# Patient Record
Sex: FEMALE | Race: WHITE
[De-identification: names, ages, dates, MRNs, and addresses within clinical notes are randomized per-mention and may not be internally consistent; named-entity substitution may affect disease eponyms.]

---

## 2020-09-11 ENCOUNTER — APPOINTMENT (OUTPATIENT)
Dept: FAMILY MEDICINE | Facility: CLINIC | Age: 55
End: 2020-09-11
Payer: COMMERCIAL

## 2020-09-11 VITALS
TEMPERATURE: 98.4 F | DIASTOLIC BLOOD PRESSURE: 70 MMHG | HEIGHT: 63 IN | RESPIRATION RATE: 16 BRPM | WEIGHT: 113 LBS | OXYGEN SATURATION: 99 % | HEART RATE: 68 BPM | SYSTOLIC BLOOD PRESSURE: 110 MMHG | BODY MASS INDEX: 20.02 KG/M2

## 2020-09-11 DIAGNOSIS — Z78.9 OTHER SPECIFIED HEALTH STATUS: ICD-10-CM

## 2020-09-11 DIAGNOSIS — Z71.89 OTHER SPECIFIED COUNSELING: ICD-10-CM

## 2020-09-11 DIAGNOSIS — Z00.00 ENCOUNTER FOR GENERAL ADULT MEDICAL EXAMINATION W/OUT ABNORMAL FINDINGS: ICD-10-CM

## 2020-09-11 DIAGNOSIS — Z20.828 CONTACT WITH AND (SUSPECTED) EXPOSURE TO OTHER VIRAL COMMUNICABLE DISEASES: ICD-10-CM

## 2020-09-11 PROCEDURE — 36415 COLL VENOUS BLD VENIPUNCTURE: CPT

## 2020-09-11 PROCEDURE — G0442 ANNUAL ALCOHOL SCREEN 15 MIN: CPT

## 2020-09-11 PROCEDURE — 99396 PREV VISIT EST AGE 40-64: CPT | Mod: 25

## 2020-09-11 PROCEDURE — G0444 DEPRESSION SCREEN ANNUAL: CPT

## 2020-09-11 NOTE — HEALTH RISK ASSESSMENT
[Excellent] : ~his/her~  mood as  excellent [No] : In the past 12 months have you used drugs other than those required for medical reasons? No [No falls in past year] : Patient reported no falls in the past year [1] : 2) Feeling down, depressed, or hopeless for several days (1) [Patient reported PAP Smear was normal] : Patient reported PAP Smear was normal [Patient reported mammogram was normal] : Patient reported mammogram was normal [Alone] : lives alone [] :  [Unemployed] : unemployed [Fully functional (using the telephone, shopping, preparing meals, housekeeping, doing laundry, using] : Fully functional and needs no help or supervision to perform IADLs (using the telephone, shopping, preparing meals, housekeeping, doing laundry, using transportation, managing medications and managing finances) [Fully functional (bathing, dressing, toileting, transferring, walking, feeding)] : Fully functional (bathing, dressing, toileting, transferring, walking, feeding) [Patient declined colonoscopy] : Patient declined colonoscopy [HIV test declined] : HIV test declined [Hepatitis C test declined] : Hepatitis C test declined [None] : None [] : No [de-identified] : running,biking, weight lifting [[id number]] : 2 [Change in mental status noted] : No change in mental status noted [Language] : denies difficulty with language [Behavior] : denies difficulty with behavior [MammogramDate] : 10/18 [PapSmearDate] : 03/19

## 2020-09-11 NOTE — PLAN
Statement Selected [FreeTextEntry1] : Excellent health\par refusing immunization\par Educated about covid-19\par Will contact the patoient with blood test results

## 2020-09-11 NOTE — HISTORY OF PRESENT ILLNESS
[FreeTextEntry1] : Establish care \par annual eval [de-identified] : Recently moved to [place]\par no complaints\par no chronic medical problems\par Has a healthy life style

## 2020-09-12 LAB
25(OH)D3 SERPL-MCNC: 45.2 NG/ML
ALBUMIN SERPL ELPH-MCNC: 4.9 G/DL
ALP BLD-CCNC: 60 U/L
ALT SERPL-CCNC: 14 U/L
ANION GAP SERPL CALC-SCNC: 15 MMOL/L
AST SERPL-CCNC: 17 U/L
BASOPHILS # BLD AUTO: 0.04 K/UL
BASOPHILS NFR BLD AUTO: 0.5 %
BILIRUB SERPL-MCNC: 0.2 MG/DL
BUN SERPL-MCNC: 15 MG/DL
CALCIUM SERPL-MCNC: 10.1 MG/DL
CHLORIDE SERPL-SCNC: 100 MMOL/L
CHOLEST SERPL-MCNC: 269 MG/DL
CHOLEST/HDLC SERPL: 3.2 RATIO
CO2 SERPL-SCNC: 26 MMOL/L
CREAT SERPL-MCNC: 0.81 MG/DL
EOSINOPHIL # BLD AUTO: 0.03 K/UL
EOSINOPHIL NFR BLD AUTO: 0.3 %
ESTIMATED AVERAGE GLUCOSE: 108 MG/DL
GLUCOSE SERPL-MCNC: 90 MG/DL
HBA1C MFR BLD HPLC: 5.4 %
HCT VFR BLD CALC: 43.1 %
HDLC SERPL-MCNC: 83 MG/DL
HGB BLD-MCNC: 13.2 G/DL
IMM GRANULOCYTES NFR BLD AUTO: 0.3 %
IRON SATN MFR SERPL: 15 %
IRON SERPL-MCNC: 44 UG/DL
LDLC SERPL CALC-MCNC: 172 MG/DL
LYMPHOCYTES # BLD AUTO: 1.84 K/UL
LYMPHOCYTES NFR BLD AUTO: 21.4 %
MAN DIFF?: NORMAL
MCHC RBC-ENTMCNC: 28.8 PG
MCHC RBC-ENTMCNC: 30.6 GM/DL
MCV RBC AUTO: 93.9 FL
MONOCYTES # BLD AUTO: 0.51 K/UL
MONOCYTES NFR BLD AUTO: 5.9 %
NEUTROPHILS # BLD AUTO: 6.15 K/UL
NEUTROPHILS NFR BLD AUTO: 71.6 %
PLATELET # BLD AUTO: 339 K/UL
POTASSIUM SERPL-SCNC: 4.8 MMOL/L
PROT SERPL-MCNC: 7.2 G/DL
RBC # BLD: 4.59 M/UL
RBC # FLD: 13.4 %
SARS-COV-2 IGG SERPL IA-ACNC: 0.09 INDEX
SARS-COV-2 IGG SERPL QL IA: NEGATIVE
SODIUM SERPL-SCNC: 141 MMOL/L
TIBC SERPL-MCNC: 291 UG/DL
TRIGL SERPL-MCNC: 68 MG/DL
TSH SERPL-ACNC: 1.25 UIU/ML
UIBC SERPL-MCNC: 247 UG/DL
VIT B12 SERPL-MCNC: 1085 PG/ML
WBC # FLD AUTO: 8.6 K/UL

## 2025-02-21 ENCOUNTER — OFFICE (OUTPATIENT)
Dept: URBAN - METROPOLITAN AREA CLINIC 29 | Facility: CLINIC | Age: 60
Setting detail: OPHTHALMOLOGY
End: 2025-02-21
Payer: COMMERCIAL

## 2025-02-21 DIAGNOSIS — H04.123: ICD-10-CM

## 2025-02-21 DIAGNOSIS — H43.393: ICD-10-CM

## 2025-02-21 DIAGNOSIS — H25.13: ICD-10-CM

## 2025-02-21 DIAGNOSIS — H53.10: ICD-10-CM

## 2025-02-21 DIAGNOSIS — H02.401: ICD-10-CM

## 2025-02-21 PROBLEM — H01.001 BLEPHARITIS; RIGHT UPPER LID, RIGHT LOWER LID, LEFT UPPER LID, LEFT LOWER LID: Status: ACTIVE | Noted: 2025-02-21

## 2025-02-21 PROBLEM — H53.021 AMBLYOPIA REFRACTIVE; RIGHT EYE: Status: ACTIVE | Noted: 2025-02-21

## 2025-02-21 PROBLEM — H16.223 DRY EYE SYNDROME K SICCA; BOTH EYES: Status: ACTIVE | Noted: 2025-02-21

## 2025-02-21 PROBLEM — H01.004 BLEPHARITIS; RIGHT UPPER LID, RIGHT LOWER LID, LEFT UPPER LID, LEFT LOWER LID: Status: ACTIVE | Noted: 2025-02-21

## 2025-02-21 PROBLEM — H43.813 POSTERIOR VITREOUS DETACHMENT; BOTH EYES: Status: ACTIVE | Noted: 2025-02-21

## 2025-02-21 PROBLEM — H01.005 BLEPHARITIS; RIGHT UPPER LID, RIGHT LOWER LID, LEFT UPPER LID, LEFT LOWER LID: Status: ACTIVE | Noted: 2025-02-21

## 2025-02-21 PROBLEM — H01.002 BLEPHARITIS; RIGHT UPPER LID, RIGHT LOWER LID, LEFT UPPER LID, LEFT LOWER LID: Status: ACTIVE | Noted: 2025-02-21

## 2025-02-21 PROCEDURE — 92015 DETERMINE REFRACTIVE STATE: CPT | Performed by: OPHTHALMOLOGY

## 2025-02-21 PROCEDURE — 92014 COMPRE OPH EXAM EST PT 1/>: CPT | Performed by: OPHTHALMOLOGY

## 2025-02-21 ASSESSMENT — TONOMETRY
OS_IOP_MMHG: 15
OD_IOP_MMHG: 16

## 2025-02-21 ASSESSMENT — REFRACTION_MANIFEST
OS_AXIS: 80
OD_CYLINDER: +0.75
OD_SPHERE: -7.00
OS_VA1: 20/25-2
OS_SPHERE: -7.50
OS_ADD: +2.00
OS_VA1: 20/25
OD_ADD: +3.00
OS_CYLINDER: +0.50
OD_VA1: 20/60
OS_CYLINDER: +0.50
OD_AXIS: 110
OD_AXIS: 110
OS_SPHERE: -7.50
OD_SPHERE: -13.50
OD_AXIS: 110
OD_VA1: 20/25+2
OD_CYLINDER: +0.75
OD_ADD: +3.00
OS_AXIS: 80
OS_VA1: 20/25-2
OS_AXIS: 070
OS_ADD: +3.00
OS_CYLINDER: +1.25
OU_VA: 20/25
OS_SPHERE: -8.50
OU_VA: 20/25
OD_VA1: 20/25+2
OS_ADD: +3.00
OD_ADD: +2.00
OD_CYLINDER: +2.00
OD_SPHERE: -7.00

## 2025-02-21 ASSESSMENT — REFRACTION_CURRENTRX
OD_OVR_VA: 20/
OS_SPHERE: -7.00
OD_ADD: +3.50
OS_AXIS: 080
OD_ADD: +3.00
OD_AXIS: 115
OD_SPHERE: -7.25
OS_VPRISM_DIRECTION: PROGS
OS_ADD: +3.00
OS_AXIS: 72
OS_SPHERE: -8.50
OD_OVR_VA: 20/
OS_OVR_VA: 20/
OS_ADD: +1.50
OS_OVR_VA: 20/
OD_AXIS: 109
OD_VPRISM_DIRECTION: PROGS
OS_CYLINDER: +1.00
OD_VPRISM_DIRECTION: PROGS
OD_CYLINDER: +1.00
OD_ADD: +1.50
OS_ADD: +3.50
OS_CYLINDER: +1.25
OD_OVR_VA: 20/
OS_OVR_VA: 20/
OS_VPRISM_DIRECTION: PROGS
OD_SPHERE: -6.50
OS_VPRISM_DIRECTION: PROGS
OS_CYLINDER: +0.75
OD_AXIS: 105
OS_SPHERE: -7.50
OD_CYLINDER: +2.75
OD_VPRISM_DIRECTION: PROGS
OD_SPHERE: -12.25
OS_AXIS: 65
OD_CYLINDER: +1.00

## 2025-02-21 ASSESSMENT — LID EXAM ASSESSMENTS
OD_BLEPHARITIS: RLL RUL 1+
OS_BLEPHARITIS: LLL LUL 1+

## 2025-02-21 ASSESSMENT — LID POSITION - PTOSIS
OD_PTOSIS: RUL T
OS_PTOSIS: 1+

## 2025-02-21 ASSESSMENT — REFRACTION_AUTOREFRACTION
OS_AXIS: 070
OS_CYLINDER: +1.25
OD_AXIS: 110
OD_CYLINDER: +2.00
OD_SPHERE: -13.75
OS_SPHERE: -7.75

## 2025-02-21 ASSESSMENT — CONFRONTATIONAL VISUAL FIELD TEST (CVF)
OD_FINDINGS: FULL
OS_FINDINGS: FULL

## 2025-02-21 ASSESSMENT — TEAR BREAK UP TIME (TBUT)
OS_TBUT: 1+
OD_TBUT: 1+

## 2025-02-21 ASSESSMENT — VISUAL ACUITY
OS_BCVA: 20/100
OD_BCVA: 20/25-1